# Patient Record
Sex: FEMALE | Race: WHITE | Employment: FULL TIME | URBAN - METROPOLITAN AREA
[De-identification: names, ages, dates, MRNs, and addresses within clinical notes are randomized per-mention and may not be internally consistent; named-entity substitution may affect disease eponyms.]

---

## 2017-08-16 ENCOUNTER — OFFICE VISIT (OUTPATIENT)
Dept: FAMILY MEDICINE CLINIC | Age: 37
End: 2017-08-16

## 2017-08-16 VITALS
WEIGHT: 152.2 LBS | HEIGHT: 63 IN | BODY MASS INDEX: 26.97 KG/M2 | DIASTOLIC BLOOD PRESSURE: 64 MMHG | RESPIRATION RATE: 18 BRPM | SYSTOLIC BLOOD PRESSURE: 108 MMHG | OXYGEN SATURATION: 100 % | HEART RATE: 69 BPM | TEMPERATURE: 98.1 F

## 2017-08-16 DIAGNOSIS — M77.12 LEFT LATERAL EPICONDYLITIS: Primary | ICD-10-CM

## 2017-08-16 DIAGNOSIS — G93.32 CHRONIC FATIGUE SYNDROME: ICD-10-CM

## 2017-08-16 DIAGNOSIS — N94.4 PRIMARY DYSMENORRHEA: ICD-10-CM

## 2017-08-16 DIAGNOSIS — E78.1 HYPERTRIGLYCERIDEMIA: ICD-10-CM

## 2017-08-16 DIAGNOSIS — G47.61 PERIODIC LIMB MOVEMENT DISORDER: ICD-10-CM

## 2017-08-16 RX ORDER — MENTHOL
1000 GEL (GRAM) TOPICAL DAILY
COMMUNITY

## 2017-08-16 RX ORDER — MULTIVIT WITH MINERALS/HERBS
1 TABLET ORAL DAILY
COMMUNITY

## 2017-08-16 RX ORDER — ZINC GLUCONATE 50 MG
TABLET ORAL
COMMUNITY

## 2017-08-16 RX ORDER — GLUCOSAMINE SULFATE 1500 MG
POWDER IN PACKET (EA) ORAL DAILY
COMMUNITY

## 2017-08-16 RX ORDER — LANOLIN ALCOHOL/MO/W.PET/CERES
1000 CREAM (GRAM) TOPICAL DAILY
COMMUNITY

## 2017-08-16 RX ORDER — ASCORBIC ACID 500 MG
TABLET ORAL
COMMUNITY

## 2017-08-16 NOTE — PATIENT INSTRUCTIONS
Arm Pain: Care Instructions  Your Care Instructions  You can hurt your arm by using it too much or by injuring it. Biking, wrestling, and home repair projects are examples of activities that can lead to arm pain. Everyday wear and tear, especially as you get older, can cause arm pain. Your forearms, wrists, hands, and fingers are the parts of your arm that are most likely to become painful. A minor arm injury usually will heal on its own with home treatment to relieve swelling and pain. If you have a more serious injury, you may need tests and treatment. Follow-up care is a key part of your treatment and safety. Be sure to make and go to all appointments, and call your doctor if you are having problems. Its also a good idea to know your test results and keep a list of the medicines you take. How can you care for yourself at home? · Take pain medicines exactly as directed. ¨ If the doctor gave you a prescription medicine for pain, take it as prescribed. ¨ If you are not taking a prescription pain medicine, ask your doctor if you can take an over-the-counter medicine. · Rest and protect your arm. Take a break from any activity that may cause pain. · Put ice or a cold pack on your arm for 10 to 20 minutes at a time. Put a thin cloth between the ice and your skin. · Prop up the sore arm on a pillow when you ice it or anytime you sit or lie down during the next 3 days. Try to keep it above the level of your heart. This will help reduce swelling. · If your doctor recommends a sling to support your arm, wear it as directed. When should you call for help? Call 911 anytime you think you may need emergency care. For example, call if:  · Your arm or hand is cool or pale or changes color. Call your doctor now or seek immediate medical care if:  · You cannot use your arm. · You have signs of infection, such as:  ¨ Increased pain, swelling, warmth, or redness. ¨ Red streaks running up or down your arm.   ¨ Pus draining from an area of your arm. ¨ A fever. · You have tingling, weakness, or numbness in your arm. Watch closely for changes in your health, and be sure to contact your doctor if:  · You do not get better as expected. Where can you learn more? Go to http://luz elena-isamar.info/. Enter B641 in the search box to learn more about \"Arm Pain: Care Instructions. \"  Current as of: March 20, 2017  Content Version: 11.3  © 8718-6735 [employer]. Care instructions adapted under license by [employer] (which disclaims liability or warranty for this information). If you have questions about a medical condition or this instruction, always ask your healthcare professional. [address] any warranty or liability for your use of this information.

## 2017-08-16 NOTE — PROGRESS NOTES
\"Reviewed record in preparation for visit and have obtained the necessary documentation\"  Chief Complaint   Patient presents with    Arm Pain     left side    Elbow Pain     left side      1. Have you been to the ER, urgent care clinic since your last visit? Hospitalized since your last visit? No    2. Have you seen or consulted any other health care providers outside of the [facility] since your last visit? Include any pap smears or colon screening.  No

## 2017-08-16 NOTE — PROGRESS NOTES
HISTORY OF PRESENT ILLNESS  HPI  Alesha Cooley is a 29 y.o. Female with history of insomnia, hyperlipidemia, and depression who presents to office today for elbow pain. Pt complains of left elbow pain for the past two months. She denies frequent grasping/chopping/hammering/etc, doing sports, and any recent injury to the area. 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       Allergies   Allergen Reactions    Other Medication Other (comments)     Intolerant of higher dose of Wellbutrin at 300mg-jittery, insomnia, anxious     Family History   Problem Relation Age of Onset    Depression Mother     Osteoporosis Mother     COPD Mother      smoker    Other Mother     COPD Father      smoker    Asthma Brother     Breast Cancer Paternal Aunt     Heart Disease Maternal Grandmother     Heart Attack Maternal Grandmother      Social History     Social History    Marital status:      Spouse name: N/A    Number of children: 0    Years of education: N/A     Occupational History    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 Eyes: Negative for blurred vision, double vision, pain and redness. Respiratory: Negative for cough, shortness of breath and wheezing. Cardiovascular: Negative for chest pain, palpitations, orthopnea, claudication, leg swelling and PND. Musculoskeletal:        Left elbow pain   Skin: Negative for itching and rash. Neurological: Negative for dizziness, tingling, tremors, sensory change, speech change, focal weakness, seizures, loss of consciousness, weakness and headaches. Results for orders placed or performed in visit on 05/29/14   LIPID PANEL   Result Value Ref Range    Cholesterol, total 194 100 - 199 mg/dL    Triglyceride 115 0 - 149 mg/dL    HDL Cholesterol 44 >39 mg/dL    VLDL, calculated 23 5 - 40 mg/dL    LDL, calculated 127 (H) 0 - 99 mg/dL   METABOLIC PANEL, COMPREHENSIVE   Result Value Ref Range    Glucose 76 65 - 99 mg/dL    BUN 10 6 - 20 mg/dL    Creatinine 0.87 0.57 - 1.00 mg/dL    GFR est non-AA 88 >59 mL/min/1.73    GFR est  >59 mL/min/1.73    BUN/Creatinine ratio 11 8 - 20    Sodium 144 134 - 144 mmol/L    Potassium 4.3 3.5 - 5.2 mmol/L    Chloride 107 97 - 108 mmol/L    CO2 19 19 - 28 mmol/L    Calcium 9.5 8.7 - 10.2 mg/dL    Protein, total 6.9 6.0 - 8.5 g/dL    Albumin 4.3 3.5 - 5.5 g/dL    GLOBULIN, TOTAL 2.6 1.5 - 4.5 g/dL    A-G Ratio 1.7 1.1 - 2.5    Bilirubin, total 0.3 0.0 - 1.2 mg/dL    Alk.  phosphatase 53 39 - 117 IU/L    AST (SGOT) 8 0 - 40 IU/L    ALT (SGPT) 8 0 - 32 IU/L   CVD REPORT   Result Value Ref Range INTERPRETATION Note     PDF IMAGE . Physical Exam  Visit Vitals    /64 (BP 1 Location: Right arm, BP Patient Position: Sitting)    Pulse 69    Temp 98.1 °F (36.7 °C) (Oral)    Resp 18    Ht 5' 3\" (1.6 m)    Wt 152 lb 3.2 oz (69 kg)    LMP 07/05/2017 (Approximate)    SpO2 100%    BMI 26.96 kg/m2     Elbows: right elbow normal, left elbow full ROM, no discomfort over the triceps tendon or the medial epicondyle, no discomfort of the biceps tendon but there is discomfort over the radial humeral joint to palpation but even more so when she grasps with the left hand, there is no erythema, warmth, or rash        ASSESSMENT and PLAN    ICD-10-CM ICD-9-CM    1. Left lateral epicondylitis M77.12 726.32 naproxen sodium (ALEVE) 220 mg tablet   2. Hypertriglyceridemia E78.1 272.1    3. Chronic fatigue syndrome R53.82 780.71    4. H/O Periodic limb movement disorder G47.61 327.51    5. Primary dysmenorrhea, controlled on BCP's N94.4 625.3      Diagnoses and all orders for this visit:    1. Left lateral epicondylitis    2. Hypertriglyceridemia    3. Chronic fatigue syndrome    4. H/O Periodic limb movement disorder    5. Primary dysmenorrhea, controlled on BCP's      Follow-up Disposition:  Return if right side tennis elbow aaaaaaaaasymptoms worsen or fail to improve. reviewed medications and side effects in detail  Please call my office if there are any questions- [phone]. Discussed expected course/resolution/complications of diagnosis in detail with patient. Medication risks/benefits/costs/interactions/alternatives discussed with patient. Pt was given an after visit summary which includes diagnoses, current medications & vitals. Pt expressed understanding with the diagnosis and plan. Patient to call if no better in 3 -4 days and prn new problems.     I recommended a combination of stretching, putting ice on the area, using anti-inflammatories, and avoiding activites that use that muscle, and I described several potential areas she could change to decrease the stress on the area. There is one stretch that she needs to do, and I would do 5-10 reps with both elbows BID, and I showed her in detail how to do that. This document was written by Tian Nolasco, as dictated by Jr Lyle MD.  I have reviewed and agree with the above note and have made corrections where appropriate Nikolas Ross M.D.

## 2017-08-16 NOTE — MR AVS SNAPSHOT
Visit Information Date & Time Provider Department Dept. Phone Encounter #  
 8/16/2017 12:00 PM Sachin Barraza  [facility] [phone] [id number] Upcoming Health Maintenance Date Due  
 PAP AKA CERVICAL CYTOLOGY 4/22/2016 INFLUENZA AGE 9 TO ADULT 8/1/2017 DTaP/Tdap/Td series (2 - Td) 2/15/2021 Allergies as of 8/16/2017  Review Complete On: 8/16/2017 By: Riley Ceballos LPN Severity Noted Reaction Type Reactions Other Medication  12/03/2010    Other (comments) Intolerant of higher dose of Wellbutrin at 300mg-jittery, insomnia, anxious Current Immunizations  Never Reviewed Name Date TDAP Vaccine 2/15/2011 Not reviewed this visit Vitals BP Pulse Temp Resp Height(growth percentile) Weight(growth percentile) 108/64 (BP 1 Location: Right arm, BP Patient Position: Sitting) 69 98.1 °F (36.7 °C) (Oral) 18 5' 3\" (1.6 m) 152 lb 3.2 oz (69 kg) LMP SpO2 BMI OB Status Smoking Status 07/05/2017 (Approximate) 100% 26.96 kg/m2 Having regular periods Never Smoker Vitals History BMI and BSA Data Body Mass Index Body Surface Area  
 26.96 kg/m 2 1.75 m 2 Preferred Pharmacy Pharmacy Name Phone [facility] [address] [place] [address] [phone] Your Updated Medication List  
  
   
This list is accurate as of: 8/16/17  2:00 PM.  Always use your most recent med list.  
  
  
  
  
 albuterol 90 mcg/actuation inhaler Commonly known as:  PROVENTIL HFA, VENTOLIN HFA, PROAIR HFA Take 2 Puffs by inhalation every six (6) hours as needed for Shortness of Breath. B COMPLEX 1 tablet Generic drug:  b complex vitamins Take 1 Tab by mouth daily. buPROPion  mg tablet Commonly known as:  WELLBUTRIN XL  
TAKE 1 TABLET DAILY  
  
 cyanocobalamin 1,000 mcg tablet Take 1,000 mcg by mouth daily. diclofenac EC 75 mg EC tablet Commonly known as:  VOLTAREN Take 1 Tab by mouth two (2) times a day. FLUoxetine 20 mg capsule Commonly known as:  PROzac TAKE 1 CAPSULE DAILY  
  
 levonorgestrel-ethinyl estradiol 0.15 mg-30 mcg 3mpk Commonly known as:  Tracie Anatolyner TAKE ONE TABLET DAILY Magnesium Oxide 500 mg Cap Take  by mouth. NIACIN (BULK)  
by Does Not Apply route. LISA 14 mcg/24 hour (3 years) Iud IUD Generic drug:  levonorgestrel 1 Each by IntraUTERine route once. VITAMIN C 500 mg tablet Generic drug:  ascorbic acid (vitamin C) Take  by mouth. VITAMIN D3 1,000 unit Cap Generic drug:  cholecalciferol Take  by mouth daily. vitamin e 1,000 unit capsule Commonly known as:  E GEMS Take 1,000 Units by mouth daily. ZINC ACETATE PO Take  by mouth. Patient Instructions Arm Pain: Care Instructions Your Care Instructions You can hurt your arm by using it too much or by injuring it. Biking, wrestling, and home repair projects are examples of activities that can lead to arm pain. Everyday wear and tear, especially as you get older, can cause arm pain. Your forearms, wrists, hands, and fingers are the parts of your arm that are most likely to become painful. A minor arm injury usually will heal on its own with home treatment to relieve swelling and pain. If you have a more serious injury, you may need tests and treatment. Follow-up care is a key part of your treatment and safety. Be sure to make and go to all appointments, and call your doctor if you are having problems. Its also a good idea to know your test results and keep a list of the medicines you take. How can you care for yourself at home? · Take pain medicines exactly as directed. ¨ If the doctor gave you a prescription medicine for pain, take it as prescribed. ¨ If you are not taking a prescription pain medicine, ask your doctor if you can take an over-the-counter medicine. · Rest and protect your arm. Take a break from any activity that may cause pain. · Put ice or a cold pack on your arm for 10 to 20 minutes at a time. Put a thin cloth between the ice and your skin. · Prop up the sore arm on a pillow when you ice it or anytime you sit or lie down during the next 3 days. Try to keep it above the level of your heart. This will help reduce swelling. · If your doctor recommends a sling to support your arm, wear it as directed. When should you call for help? Call 911 anytime you think you may need emergency care. For example, call if: 
· Your arm or hand is cool or pale or changes color. Call your doctor now or seek immediate medical care if: 
· You cannot use your arm. · You have signs of infection, such as: 
¨ Increased pain, swelling, warmth, or redness. ¨ Red streaks running up or down your arm. ¨ Pus draining from an area of your arm. ¨ A fever. · You have tingling, weakness, or numbness in your arm. Watch closely for changes in your health, and be sure to contact your doctor if: 
· You do not get better as expected. Where can you learn more? Go to http://luz elena-isamar.info/. Enter B641 in the search box to learn more about \"Arm Pain: Care Instructions. \" Current as of: March 20, 2017 Content Version: 11.3 © 6696-6770 [employer]. Care instructions adapted under license by [employer] (which disclaims liability or warranty for this information). If you have questions about a medical condition or this instruction, always ask your healthcare professional. [address] any warranty or liability for your use of this information. Introducing [facility]! Dear Irina Grew: 
Thank you for requesting a [employer] account. Our records indicate that you already have an active [employer] account. You can access your account anytime at [email or web address] Did you know that you can access your hospital and ER discharge instructions at any time in [employer]? You can also review all of your test results from your hospital stay or ER visit. Additional Information If you have questions, please visit the Frequently Asked Questions section of the [employer] website at [email or web address] Remember, [employer] is NOT to be used for urgent needs. For medical emergencies, dial 911. Now available from your iPhone and Android! Please provide this summary of care documentation to your next provider. Your primary care clinician is listed as DARIN STRATTON. If you have any questions after today's visit, please call [phone].

## 2017-08-17 RX ORDER — NAPROXEN SODIUM 220 MG
220-440 TABLET ORAL 2 TIMES DAILY WITH MEALS
COMMUNITY